# Patient Record
Sex: FEMALE | Race: WHITE | URBAN - METROPOLITAN AREA
[De-identification: names, ages, dates, MRNs, and addresses within clinical notes are randomized per-mention and may not be internally consistent; named-entity substitution may affect disease eponyms.]

---

## 2021-11-23 ENCOUNTER — OFFICE VISIT (OUTPATIENT)
Dept: URBAN - METROPOLITAN AREA CLINIC 58 | Facility: CLINIC | Age: 66
End: 2021-11-23
Payer: MEDICARE

## 2021-11-23 DIAGNOSIS — H35.3131 BILATERAL NONEXUDATIVE AGE-RELATED MACULAR DEGENERATION, EARLY DRY STAGE: ICD-10-CM

## 2021-11-23 DIAGNOSIS — H25.13 AGE-RELATED NUCLEAR CATARACT, BILATERAL: Primary | ICD-10-CM

## 2021-11-23 PROCEDURE — 92133 CPTRZD OPH DX IMG PST SGM ON: CPT | Performed by: OPHTHALMOLOGY

## 2021-11-23 PROCEDURE — 99203 OFFICE O/P NEW LOW 30 MIN: CPT | Performed by: OPHTHALMOLOGY

## 2021-11-23 ASSESSMENT — VISUAL ACUITY
OS: 20/30
OD: 20/30

## 2021-11-23 ASSESSMENT — KERATOMETRY
OS: 41.38
OD: 41.63

## 2021-11-23 ASSESSMENT — INTRAOCULAR PRESSURE
OD: 17
OS: 15
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
## 2021-11-23 NOTE — IMPRESSION/PLAN
Impression: Bilateral nonexudative age-related macular degeneration, early dry stage: H35.3131. Plan: Macular degeneration, dry type. Will continue to monitor vision and the patient has been instructed to call with any vision changes.

## 2022-01-04 ENCOUNTER — OFFICE VISIT (OUTPATIENT)
Dept: URBAN - METROPOLITAN AREA CLINIC 58 | Facility: CLINIC | Age: 67
End: 2022-01-04
Payer: MEDICARE

## 2022-01-04 PROCEDURE — 99213 OFFICE O/P EST LOW 20 MIN: CPT | Performed by: OPHTHALMOLOGY

## 2022-01-04 PROCEDURE — 92083 EXTENDED VISUAL FIELD XM: CPT | Performed by: OPHTHALMOLOGY

## 2022-01-04 RX ORDER — LATANOPROST 50 UG/ML
0.005 % SOLUTION OPHTHALMIC
Qty: 7.5 | Refills: 3 | Status: ACTIVE
Start: 2022-01-04

## 2022-01-04 ASSESSMENT — INTRAOCULAR PRESSURE
OD: 19
OS: 23
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
## 2022-02-15 ENCOUNTER — OFFICE VISIT (OUTPATIENT)
Dept: URBAN - METROPOLITAN AREA CLINIC 58 | Facility: CLINIC | Age: 67
End: 2022-02-15
Payer: MEDICARE

## 2022-02-15 DIAGNOSIS — H40.1231 LOW-TENSION GLAUCOMA, BILATERAL, MILD STAGE: Primary | ICD-10-CM

## 2022-02-15 PROCEDURE — 99212 OFFICE O/P EST SF 10 MIN: CPT | Performed by: OPHTHALMOLOGY

## 2022-02-15 ASSESSMENT — INTRAOCULAR PRESSURE
OD: 12
OS: 16

## 2022-02-15 NOTE — IMPRESSION/PLAN
Impression: Low-tension glaucoma, bilateral, mild stage: X69.8549. Plan: Continue latanoprost QHS OU. Call if any concerns. 6 months for DE with OCT RNFL.

## 2022-08-16 ENCOUNTER — OFFICE VISIT (OUTPATIENT)
Dept: URBAN - METROPOLITAN AREA CLINIC 58 | Facility: CLINIC | Age: 67
End: 2022-08-16
Payer: MEDICARE

## 2022-08-16 DIAGNOSIS — H35.3131 BILATERAL NONEXUDATIVE AGE-RELATED MACULAR DEGENERATION, EARLY DRY STAGE: ICD-10-CM

## 2022-08-16 DIAGNOSIS — H40.1231 LOW-TENSION GLAUCOMA, BILATERAL, MILD STAGE: Primary | ICD-10-CM

## 2022-08-16 PROCEDURE — 92133 CPTRZD OPH DX IMG PST SGM ON: CPT | Performed by: OPHTHALMOLOGY

## 2022-08-16 PROCEDURE — 99213 OFFICE O/P EST LOW 20 MIN: CPT | Performed by: OPHTHALMOLOGY

## 2022-08-16 RX ORDER — TIMOLOL MALEATE 5 MG/ML
0.5 % SOLUTION/ DROPS OPHTHALMIC
Qty: 10 | Refills: 3 | Status: ACTIVE
Start: 2022-08-16

## 2022-08-16 ASSESSMENT — INTRAOCULAR PRESSURE
OD: 16
OS: 16

## 2022-08-16 NOTE — IMPRESSION/PLAN
Impression: Bilateral nonexudative age-related macular degeneration, early dry stage: H35.3131. Plan: Macular degeneration, dry type. Will continue to monitor vision and the patient has been instructed to call with any vision changes. Recommend AREDS eye vitamins.

## 2022-08-16 NOTE — IMPRESSION/PLAN
Impression: Low-tension glaucoma, bilateral, mild stage: P17.0621. Plan: OCT ON ordered and performed today shows worsening. Start Timolol OU QAM and continue latanoprost QHS OU. Will continue to monitor. Call if any concerns or side effects with new drop.

## 2022-12-16 ENCOUNTER — OFFICE VISIT (OUTPATIENT)
Dept: URBAN - METROPOLITAN AREA CLINIC 58 | Facility: CLINIC | Age: 67
End: 2022-12-16
Payer: MEDICARE

## 2022-12-16 DIAGNOSIS — H25.13 AGE-RELATED NUCLEAR CATARACT, BILATERAL: ICD-10-CM

## 2022-12-16 DIAGNOSIS — H35.3131 BILATERAL NONEXUDATIVE AGE-RELATED MACULAR DEGENERATION, EARLY DRY STAGE: ICD-10-CM

## 2022-12-16 DIAGNOSIS — H40.1231 LOW-TENSION GLAUCOMA, BILATERAL, MILD STAGE: Primary | ICD-10-CM

## 2022-12-16 PROCEDURE — 92004 COMPRE OPH EXAM NEW PT 1/>: CPT | Performed by: OPTOMETRIST

## 2022-12-16 PROCEDURE — 92133 CPTRZD OPH DX IMG PST SGM ON: CPT | Performed by: OPTOMETRIST

## 2022-12-16 ASSESSMENT — INTRAOCULAR PRESSURE
OS: 14
OD: 14

## 2022-12-16 NOTE — IMPRESSION/PLAN
Impression: Low-tension glaucoma, bilateral, mild stage: D47.4962. Plan: OCT ON ordered and performed today shows worsening. Continue Timolol OU QAM and continue latanoprost QHS OU. Will continue to monitor.

## 2023-06-16 ENCOUNTER — OFFICE VISIT (OUTPATIENT)
Dept: URBAN - METROPOLITAN AREA CLINIC 58 | Facility: CLINIC | Age: 68
End: 2023-06-16
Payer: MEDICARE

## 2023-06-16 DIAGNOSIS — H52.4 PRESBYOPIA: ICD-10-CM

## 2023-06-16 DIAGNOSIS — H25.13 AGE-RELATED NUCLEAR CATARACT, BILATERAL: Primary | ICD-10-CM

## 2023-06-16 DIAGNOSIS — H40.1231 LOW-TENSION GLAUCOMA, BILATERAL, MILD STAGE: ICD-10-CM

## 2023-06-16 PROCEDURE — 92083 EXTENDED VISUAL FIELD XM: CPT | Performed by: OPTOMETRIST

## 2023-06-16 PROCEDURE — 99213 OFFICE O/P EST LOW 20 MIN: CPT | Performed by: OPTOMETRIST

## 2023-06-16 ASSESSMENT — VISUAL ACUITY
OS: 20/30
OD: 20/25

## 2023-06-16 ASSESSMENT — KERATOMETRY
OD: 42.13
OS: 42.00

## 2023-06-16 ASSESSMENT — INTRAOCULAR PRESSURE
OS: 14
OD: 12

## 2023-06-16 NOTE — IMPRESSION/PLAN
Impression: Low-tension glaucoma, bilateral, mild stage: L21.8071. Plan: Diagnosis discussed. VF 24-2 ordered and performed today shows mild inf IAS OD and moderate SAS/IAS stable OS. Continue Timolol OU QAM and latanoprost QHS OU. IOP stable. Will continue to monitor.

## 2023-06-16 NOTE — IMPRESSION/PLAN
Impression: Age-related nuclear cataract, bilateral: H25.13. Plan: Discussed diagnosis in detail with patient. No treatment is required at this time. Will continue to observe condition and or symptoms. Call if [address] worsens. Recheck cataracts next visit.

## 2023-12-21 ENCOUNTER — OFFICE VISIT (OUTPATIENT)
Dept: URBAN - METROPOLITAN AREA CLINIC 58 | Facility: CLINIC | Age: 68
End: 2023-12-21
Payer: MEDICARE

## 2023-12-21 DIAGNOSIS — H40.1231 LOW-TENSION GLAUCOMA, BILATERAL, MILD STAGE: Primary | ICD-10-CM

## 2023-12-21 DIAGNOSIS — H25.13 AGE-RELATED NUCLEAR CATARACT, BILATERAL: ICD-10-CM

## 2023-12-21 PROCEDURE — 92014 COMPRE OPH EXAM EST PT 1/>: CPT | Performed by: OPTOMETRIST

## 2023-12-21 PROCEDURE — 92133 CPTRZD OPH DX IMG PST SGM ON: CPT | Performed by: OPTOMETRIST

## 2023-12-21 ASSESSMENT — INTRAOCULAR PRESSURE
OS: 14
OD: 14

## 2023-12-21 ASSESSMENT — VISUAL ACUITY
OS: 20/30
OD: 20/30

## 2023-12-21 ASSESSMENT — KERATOMETRY
OS: 42.13
OD: 42.75

## 2024-08-13 ENCOUNTER — OFFICE VISIT (OUTPATIENT)
Dept: URBAN - METROPOLITAN AREA CLINIC 58 | Facility: CLINIC | Age: 69
End: 2024-08-13
Payer: MEDICARE

## 2024-08-13 DIAGNOSIS — H25.13 AGE-RELATED NUCLEAR CATARACT, BILATERAL: Primary | ICD-10-CM

## 2024-08-13 DIAGNOSIS — H40.1231 LOW-TENSION GLAUCOMA, BILATERAL, MILD STAGE: ICD-10-CM

## 2024-08-13 PROCEDURE — 92083 EXTENDED VISUAL FIELD XM: CPT | Performed by: OPHTHALMOLOGY

## 2024-08-13 PROCEDURE — 92014 COMPRE OPH EXAM EST PT 1/>: CPT | Performed by: OPHTHALMOLOGY

## 2024-08-13 ASSESSMENT — VISUAL ACUITY
OD: 20/30
OS: 20/40

## 2024-08-13 ASSESSMENT — KERATOMETRY
OS: 42.00
OD: 42.00

## 2024-08-13 ASSESSMENT — INTRAOCULAR PRESSURE
OD: 15
OS: 18